# Patient Record
Sex: FEMALE | Employment: FULL TIME | URBAN - NONMETROPOLITAN AREA
[De-identification: names, ages, dates, MRNs, and addresses within clinical notes are randomized per-mention and may not be internally consistent; named-entity substitution may affect disease eponyms.]

---

## 2022-09-11 NOTE — PROGRESS NOTES
[address] [facility] [address] [address]  Dept: [phone]  Dept Fax: [phone]: [phone]      Assessment & Plan   1. Acute post-traumatic headache, not intractable  Persistent daily headaches now, months after trauma. Concussion/vestibular therapy completed w/o help for headaches. No on caffeine or other substances. - CBC with Auto Differential; Future  - Comprehensive Metabolic Panel, Fasting; Future  - Fioricet for breakthrough headaches, to be used sparingly  - obtain MRI brain due to persistent headaches and hx of recent trauma    2. Screening due  - Lipid, Fasting; Future    Patient to obtain labs at a future date. Health Maintenance  Cervical Cancer Screening - Completed 2/10/22, normal w/o cotesting. Due 2025 next. Vaccinations  - Covid w/ Moderna. Bivalent booster.   - Flu Vaccine - encouraged  - Tetanus due 2032    Return in about 1 month (around 10/12/2022). History of Present Illness   Reason for Appointment:   Chief Complaint   Patient presents with    New Patient     Pt presents as a new patient. She would like to talk about migraines she has had February. Mihaela Schulz is a 24 y.o. female who presents today for:  Migraines due to head injury a few months back - a steel gate, dazed her. Went to [facility] ED. No fam hx of aneurysms or kidney disease. Works at car part manufacturing. Doesn't sound like there are inhalant exposures that cause her headaches. Tylenol and excedrine makes her drowsy. But continues to take as it helps  Daily headaches for about 20-30 min, self limited. Has photo and phonophobia. Having it almost daily. Review of Systems   Constitutional: Negative. HENT: Negative. Eyes: Negative. Respiratory: Negative. Cardiovascular: Negative. Gastrointestinal: Negative. Neurological:  Positive for headaches.  Negative for dizziness, seizures, syncope, facial asymmetry, speech difficulty, weakness, light-headedness and numbness. Future Appointments   Date Time Provider Dede Burks   10/14/2022  8:00 AM Kirstin Siddiqui MD SRPX [facility] [place]       [facility]    Patient Active Problem List    Diagnosis Date Noted    Acute post-traumatic headache, not intractable 09/12/2022     Priority: Medium         PSH    No past surgical history on file. Meds    Prior to Admission medications    Medication Sig Start Date End Date Taking? Authorizing Provider   butalbital-acetaminophen-caffeine (FIORICET, ESGIC) -40 MG per tablet Take 1 tablet by mouth every 6 hours as needed for Headaches or Migraine Don't use for more than 3 days to avoid overuse headaches. 9/12/22  Yes Jess López, DO        Allergies    Patient has no known allergies. Social    Social History     Tobacco Use    Smoking status: Never    Smokeless tobacco: Never   Substance Use Topics    Alcohol use: Never    Drug use: Never       Objective     Vitals:    09/12/22 0815   BP: 110/82   Pulse: 86   Resp: 12   Temp: (!) 96.7 °F (35.9 °C)   SpO2: 100%       Physical Exam:  GENERAL: Alert and oriented. No distress. EYES: no papilledema. EOMI.  ENT: No thyromegaly or cervical lymphadenopathy. No JVD. HEART: Normal S1/S2. No murmur, rub or gallop. LUNGS: Clear to auscultation. ABDOMEN: Soft and non-tender.   EXTREMITIES: no edema  NEUROLOGICAL: Grossly normal.  SKIN: no rashes    Electronically signed by Kirstin Siddiqui MD on 9/12/2022 at 11:39 PM

## 2022-09-12 ENCOUNTER — OFFICE VISIT (OUTPATIENT)
Dept: FAMILY MEDICINE CLINIC | Age: 22
End: 2022-09-12
Payer: COMMERCIAL

## 2022-09-12 VITALS
SYSTOLIC BLOOD PRESSURE: 110 MMHG | DIASTOLIC BLOOD PRESSURE: 82 MMHG | TEMPERATURE: 96.7 F | HEART RATE: 86 BPM | HEIGHT: 62 IN | WEIGHT: 148.4 LBS | BODY MASS INDEX: 27.31 KG/M2 | RESPIRATION RATE: 12 BRPM | OXYGEN SATURATION: 100 %

## 2022-09-12 DIAGNOSIS — Z13.9 SCREENING DUE: ICD-10-CM

## 2022-09-12 DIAGNOSIS — G44.319 ACUTE POST-TRAUMATIC HEADACHE, NOT INTRACTABLE: Primary | ICD-10-CM

## 2022-09-12 PROCEDURE — G8427 DOCREV CUR MEDS BY ELIG CLIN: HCPCS | Performed by: STUDENT IN AN ORGANIZED HEALTH CARE EDUCATION/TRAINING PROGRAM

## 2022-09-12 PROCEDURE — G8419 CALC BMI OUT NRM PARAM NOF/U: HCPCS | Performed by: STUDENT IN AN ORGANIZED HEALTH CARE EDUCATION/TRAINING PROGRAM

## 2022-09-12 PROCEDURE — 99203 OFFICE O/P NEW LOW 30 MIN: CPT | Performed by: STUDENT IN AN ORGANIZED HEALTH CARE EDUCATION/TRAINING PROGRAM

## 2022-09-12 PROCEDURE — 1036F TOBACCO NON-USER: CPT | Performed by: STUDENT IN AN ORGANIZED HEALTH CARE EDUCATION/TRAINING PROGRAM

## 2022-09-12 RX ORDER — BUTALBITAL, ACETAMINOPHEN AND CAFFEINE 50; 325; 40 MG/1; MG/1; MG/1
1 TABLET ORAL EVERY 6 HOURS PRN
Qty: 10 TABLET | Refills: 0 | Status: SHIPPED | OUTPATIENT
Start: 2022-09-12

## 2022-09-12 SDOH — ECONOMIC STABILITY: FOOD INSECURITY: WITHIN THE PAST 12 MONTHS, THE FOOD YOU BOUGHT JUST DIDN'T LAST AND YOU DIDN'T HAVE MONEY TO GET MORE.: SOMETIMES TRUE

## 2022-09-12 SDOH — ECONOMIC STABILITY: FOOD INSECURITY: WITHIN THE PAST 12 MONTHS, YOU WORRIED THAT YOUR FOOD WOULD RUN OUT BEFORE YOU GOT MONEY TO BUY MORE.: SOMETIMES TRUE

## 2022-09-12 ASSESSMENT — PATIENT HEALTH QUESTIONNAIRE - PHQ9
1. LITTLE INTEREST OR PLEASURE IN DOING THINGS: 1
SUM OF ALL RESPONSES TO PHQ QUESTIONS 1-9: 2
2. FEELING DOWN, DEPRESSED OR HOPELESS: 1
SUM OF ALL RESPONSES TO PHQ QUESTIONS 1-9: 2
SUM OF ALL RESPONSES TO PHQ9 QUESTIONS 1 & 2: 2

## 2022-09-12 ASSESSMENT — SOCIAL DETERMINANTS OF HEALTH (SDOH): HOW HARD IS IT FOR YOU TO PAY FOR THE VERY BASICS LIKE FOOD, HOUSING, MEDICAL CARE, AND HEATING?: NOT VERY HARD

## 2022-09-12 ASSESSMENT — ENCOUNTER SYMPTOMS
GASTROINTESTINAL NEGATIVE: 1
EYES NEGATIVE: 1
RESPIRATORY NEGATIVE: 1

## 2022-09-12 NOTE — PROGRESS NOTES
S: 24 y.o. female with   Chief Complaint   Patient presents with    New Patient     Pt presents as a new patient. She would like to talk about migraines she has had February. HPI: please see resident note for HPI and ROS. New patient. Headaches - hit in the head at work a couple months ago, completed concussion therapy. She is having daily headaches now, last 20-30 minutes. Tylenol, Excedrin, Ibuprofen helpful. Photosensitivity associated. Otherwise healthy, no concerns. BP Readings from Last 3 Encounters:   09/12/22 110/82     Wt Readings from Last 3 Encounters:   09/12/22 148 lb 6.4 oz (67.3 kg)       O: VS:  height is 5' 2\" (1.575 m) and weight is 148 lb 6.4 oz (67.3 kg). Her temperature is 96.7 °F (35.9 °C) (abnormal). Her blood pressure is 110/82 and her pulse is 86. Her respiration is 12 and oxygen saturation is 100%. AAO/NAD, appropriate affect for mood  CV:  RRR, no murmur  Resp: CTAB    See Resident note for complete exam    1. Acute post-traumatic headache, not intractable  - CBC with Auto Differential; Future  - Comprehensive Metabolic Panel, Fasting; Future    2. Screening due  - Lipid, Fasting; Future      Plan:  Please refer to resident note for full plan. Will check MRI brain  Trial Fioricet   Check basic labs  Follow-up after imaging complete       Return in about 1 month (around 10/12/2022). Health Maintenance Due   Topic Date Due    COVID-19 Vaccine (1) Never done    Varicella vaccine (2 of 2 - 2-dose childhood series) 08/10/2006    HPV vaccine (1 - 2-dose series) Never done    Depression Screen  Never done    HIV screen  Never done    Hepatitis C screen  Never done    Flu vaccine (1) Never done     I have discussed the case, including pertinent history and exam findings with the resident and attending physician. I agree with the documented assessment and plan as documented by the resident.       Ruddy March MD 9/12/2022 9:06 AM

## 2022-09-12 NOTE — PATIENT INSTRUCTIONS
MRI of Brain  Fioricet prescribed to [facility]  CBC, CMP, and Fasting Lipids ordered  Bivalent Booster Appt at [place] [address] when able. Where to get your labs:  3 locations: If you have imaging studies to do, go to the third option. [facility] stand-alone building with parking out front  [address] [facility] ([employer], just across the street from [address])  [place], [address]  P: [phone]    Hours:  Milon Mater 6:00AM-6:00PM  Saturday 6:30AM-12:00PM      [address] [facility] [address] Thomas on the second floor of same building where you had your family medicine appointment  [address] [facility] [address] [facility], [address]  P: [phone]    Hours: (Note restricted hours)  Mon - Thu 8:00AM-3:00PM  Closed for lunch - 12:00pm - 1:00PM  Fri - Closed    [facility] - can also get x-ray, CT scans, other imaging here  [address] [place], [address]  Tel: [phone]    Hours:  Annette Zacariasg - Fri 6am to 5pm  Saturday - 6:00 am - 12:00 pm *xray,ct, other imaging services not available at this location on weekends.   Sunday - Closed

## 2022-09-12 NOTE — PROGRESS NOTES
S: 24 y.o. female with   Chief Complaint   Patient presents with    New Patient     Pt presents as a new patient. She would like to talk about migraines she has had February. HPI: please see resident note for HPI and ROS. BP Readings from Last 3 Encounters:   09/12/22 110/82     Wt Readings from Last 3 Encounters:   09/12/22 148 lb 6.4 oz (67.3 kg)       O: VS:  height is 5' 2\" (1.575 m) and weight is 148 lb 6.4 oz (67.3 kg). Her temperature is 96.7 °F (35.9 °C) (abnormal). Her blood pressure is 110/82 and her pulse is 86. Her respiration is 12 and oxygen saturation is 100%. AAO/NAD, appropriate affect for mood  CV:  RRR, no murmur  Resp: CTAB       Diagnosis Orders   1. Acute post-traumatic headache, not intractable  CBC with Auto Differential    Comprehensive Metabolic Panel, Fasting    butalbital-acetaminophen-caffeine (FIORICET, ESGIC) -40 MG per tablet      2. Screening due  Lipid, Fasting          Plan:  Please refer to resident note for full plan. 68-year-old female presents the office to establish as a new patient and discuss concerns of headache. Overall patient doing really well with no concerns. Headaches: Patient has had posttraumatic headaches after concussion a few months ago. Continues to experience phonophobia and photophobia on a daily basis with headaches lasting 20 to 30 minutes. Is able to be resolved with medication such as Tylenol/Excedrin/ibuprofen. Headaches have not changed since this time. Has already completed concussion therapy/vestibular therapy has not resolved her symptoms. Patient is interested in discussing possible treatment options. We will plan to obtain MRI brain secondary to new onset headaches in an adult that are worsening/not improving, that are posttraumatic without history of imaging in the past, and neurologic symptoms of photophobia/phonophobia. Will use Fioricet as needed for breakthrough headaches.   Plan to follow-up in 1 month for headache reevaluation and review imaging. Will also plan to obtain fasting blood work for review. Health Maintenance Due   Topic Date Due    COVID-19 Vaccine (1) Never done    Varicella vaccine (2 of 2 - 2-dose childhood series) 08/10/2006    HPV vaccine (1 - 2-dose series) Never done    Depression Screen  Never done    HIV screen  Never done    Hepatitis C screen  Never done    Flu vaccine (1) Never done       Attending Physician Statement  I have discussed the case, including pertinent history and exam findings with the resident. I also have seen the patient and performed key portions of the examination. I agree with the documented assessment and plan as documented by the resident.   BEA Nina DO 9/12/2022 9:10 AM

## 2022-09-23 ENCOUNTER — HOSPITAL ENCOUNTER (OUTPATIENT)
Dept: MRI IMAGING | Age: 22
Discharge: HOME | End: 2022-09-23
Payer: COMMERCIAL

## 2022-09-23 DIAGNOSIS — G44.319 ACUTE POST-TRAUMATIC HEADACHE, NOT INTRACTABLE: ICD-10-CM

## 2022-09-23 PROCEDURE — 70551 MRI BRAIN STEM W/O DYE: CPT

## 2022-09-26 ENCOUNTER — TELEPHONE (OUTPATIENT)
Dept: FAMILY MEDICINE CLINIC | Age: 22
End: 2022-09-26

## 2022-09-26 NOTE — TELEPHONE ENCOUNTER
----- Message from Rufian Mendez MD sent at 9/24/2022 11:14 PM EDT -----  Let patient know her MRI results showed no abnormalities. Remind her to keep a headache diary and I'll see her at the next appointment. Feel free to reach out if you have questions or concerns. Thanks!

## 2022-11-07 ENCOUNTER — OFFICE VISIT (OUTPATIENT)
Dept: FAMILY MEDICINE CLINIC | Age: 22
End: 2022-11-07
Payer: COMMERCIAL

## 2022-11-07 VITALS
SYSTOLIC BLOOD PRESSURE: 112 MMHG | TEMPERATURE: 97.4 F | BODY MASS INDEX: 27.82 KG/M2 | OXYGEN SATURATION: 98 % | DIASTOLIC BLOOD PRESSURE: 64 MMHG | HEIGHT: 62 IN | HEART RATE: 58 BPM | WEIGHT: 151.2 LBS

## 2022-11-07 DIAGNOSIS — Z13.9 SCREENING DUE: ICD-10-CM

## 2022-11-07 DIAGNOSIS — R51.9 NONINTRACTABLE HEADACHE, UNSPECIFIED CHRONICITY PATTERN, UNSPECIFIED HEADACHE TYPE: Primary | ICD-10-CM

## 2022-11-07 PROCEDURE — 99213 OFFICE O/P EST LOW 20 MIN: CPT | Performed by: STUDENT IN AN ORGANIZED HEALTH CARE EDUCATION/TRAINING PROGRAM

## 2022-11-07 PROCEDURE — G8484 FLU IMMUNIZE NO ADMIN: HCPCS | Performed by: STUDENT IN AN ORGANIZED HEALTH CARE EDUCATION/TRAINING PROGRAM

## 2022-11-07 PROCEDURE — G8427 DOCREV CUR MEDS BY ELIG CLIN: HCPCS | Performed by: STUDENT IN AN ORGANIZED HEALTH CARE EDUCATION/TRAINING PROGRAM

## 2022-11-07 PROCEDURE — 1036F TOBACCO NON-USER: CPT | Performed by: STUDENT IN AN ORGANIZED HEALTH CARE EDUCATION/TRAINING PROGRAM

## 2022-11-07 PROCEDURE — G8419 CALC BMI OUT NRM PARAM NOF/U: HCPCS | Performed by: STUDENT IN AN ORGANIZED HEALTH CARE EDUCATION/TRAINING PROGRAM

## 2022-11-07 RX ORDER — BUTALBITAL, ACETAMINOPHEN AND CAFFEINE 50; 325; 40 MG/1; MG/1; MG/1
1 TABLET ORAL EVERY 6 HOURS PRN
Qty: 10 TABLET | Refills: 2 | Status: SHIPPED | OUTPATIENT
Start: 2022-11-07

## 2022-11-07 ASSESSMENT — ENCOUNTER SYMPTOMS
EYES NEGATIVE: 1
GASTROINTESTINAL NEGATIVE: 1
RESPIRATORY NEGATIVE: 1

## 2022-11-07 NOTE — PROGRESS NOTES
Health Maintenance Due   Topic Date Due    Varicella vaccine (2 of 2 - 2-dose childhood series) 08/10/2006    HPV vaccine (1 - 2-dose series) Never done    HIV screen  Never done    Hepatitis C screen  Never done    COVID-19 Vaccine (3 - Booster for Moderna series) 07/31/2021    Flu vaccine (1) Never done

## 2022-11-07 NOTE — PROGRESS NOTES
[address] [facility] [address] E[address] [facility] [address]  Dept: [phone]  Loc: [phone]      Shen Jo (:  2000) is a 24 y.o. female,Established patient, here for evaluation of the following chief complaint(s):  1 Month Follow-Up and Epistaxis (Frequent nose bleeds during the day.)      ASSESSMENT/PLAN:  1. Acute post-traumatic headache, not intractable  The following orders have not been finalized:  -     butalbital-acetaminophen-caffeine (FIORICET, ESGIC) -40 MG per tablet  2. Screening due    Headache diary  Continue Fioricet sparingly as needed. Please see resident note for full Assessment/Plan, HPI, ROS and PE    Return if symptoms worsen or fail to improve, for depending on headache diary. .    SUBJECTIVE/OBJECTIVE:  HPI  Photophobia  Accidental trauma to head with headaches, negative MRI. Sparing use of Fioricet resolves headache symptoms. Thought to be stress related as she still has headaches. Vitals:    22 1006   BP: 112/64   Site: Left Upper Arm   Position: Sitting   Cuff Size: Medium Adult   Pulse: 58   Temp: 97.4 °F (36.3 °C)   TempSrc: Temporal   SpO2: 98%   Weight: 151 lb 3.2 oz (68.6 kg)   Height: 5' 2\" (1.575 m)         An electronic signature was used to authenticate this note.     --Michelle Peraza MD

## 2022-11-07 NOTE — PATIENT INSTRUCTIONS
Zyrtec  Oxymetazoline    Where to get your labs:  3 locations: If you have imaging studies to do, go to the third option. [facility] stand-alone building with parking out front  [address] [facility] ([employer], just across the street from [address])  [address] [facility], [address]  P: [phone]    Hours:  Adelfo Wolf 6:00AM-6:00PM  Saturday 6:30AM-12:00PM      Chase Thomas on the second floor of same building where you had your family medicine appointment  [address] [facility] [address] [facility], [address]  P: [phone]    Hours: (Note restricted hours)  Mon - Thu 8:00AM-3:00PM  Closed for lunch - 12:00pm - 1:00PM  Fri - Closed    [facility] - can also get x-ray, CT scans, other imaging here  [address]   [address] [facility], [address]  Tel: [phone]    Hours:  Dory Solo - Fri 6am to 5pm  Saturday - 6:00 am - 12:00 pm *xray,ct, other imaging services not available at this location on weekends.   Sunday - Closed

## 2022-11-07 NOTE — Clinical Note
[address] [place] [address],[address] [facility] [address]  Phone: [phone]  Fax: [phone]    Neto Olson MD        November 7, 2022     Patient: Jack Beatty   YOB: 2000   Date of Visit: 11/7/2022       To Whom It May Concern: It is my medical opinion that Jack Beatty {Work release (duty restriction):40095}. If you have any questions or concerns, please don't hesitate to call.     Sincerely,        Neto Olson MD

## 2022-11-07 NOTE — PROGRESS NOTES
[address] [facility] [address] [facility] [address]  Dept: [phone]  Dept Fax: [phone]: [phone]      Assessment & Plan   1. Nonintractable headache, unspecified chronicity pattern, unspecified headache type  Continues to have mild headaches. Asked to keep headache diary and for patient to reach out in a few weeks with the results before deciding on further follow up. Will refill fioricet to be used sparingly. MRI brain wnl. Has recovered for previous head injury. - butalbital-acetaminophen-caffeine (FIORICET, ESGIC) -40 MG per tablet; Take 1 tablet by mouth every 6 hours as needed for Headaches or Migraine Don't use for more than 3 days to avoid overuse headaches. Dispense: 10 tablet; Refill: 2    2. Screening due  - Hepatitis C Antibody; Future  - HIV Screen; Future     Health Maintenance    Vaccinations  - Covid w/ Moderna - need bivalent booster.   - Flu Vaccine - encouraged  - HPV?  - HIV/Hep C    Return if symptoms worsen or fail to improve, for depending on headache diary. .     History of Present Illness   Reason for Appointment:   Chief Complaint   Patient presents with    1 Month Follow-Up    Epistaxis     Frequent nose bleeds during the day. Elena Lui is a 24 y.o. female who presents today for:follow up of headaches    MRI brain wnl. Need to obtain routine labs still. Asked to keep headache diary. Headaches. Nose bleeds about 3-4 times last month. Painful callus on pinky toe - foot doctor     Prev Hx:  Migraines due to head injury a few months back - a steel gate, dazed her. Went to [facility] ED. No fam hx of aneurysms or kidney disease. Works at car part manufacturing. Tylenol and excedrine makes her drowsy. But continues to take as it helps  Daily headaches for about 20-30 min, self limited. Has photo and phonophobia. Having it almost daily.       Review of Systems Constitutional: Negative. HENT: Negative. Eyes: Negative. Respiratory: Negative. Cardiovascular: Negative. Gastrointestinal: Negative. Neurological:  Positive for headaches. Negative for dizziness, seizures, syncope, facial asymmetry, speech difficulty, weakness, light-headedness and numbness. No future appointments. PMH    Patient Active Problem List    Diagnosis Date Noted    Acute post-traumatic headache, not intractable 09/12/2022     Priority: Medium         PSH    No past surgical history on file. Meds    Prior to Admission medications    Medication Sig Start Date End Date Taking? Authorizing Provider   butalbital-acetaminophen-caffeine (FIORICET, ESGIC) -40 MG per tablet Take 1 tablet by mouth every 6 hours as needed for Headaches or Migraine Don't use for more than 3 days to avoid overuse headaches. 11/7/22  Yes Carlos Marino MD        Allergies    Patient has no known allergies. Social    Social History     Tobacco Use    Smoking status: Never    Smokeless tobacco: Never   Substance Use Topics    Alcohol use: Never    Drug use: Never       Objective     Vitals:    11/07/22 1006   BP: 112/64   Pulse: 58   Temp: 97.4 °F (36.3 °C)   SpO2: 98%       Physical Exam:  GENERAL: Alert and oriented. No distress. EYES: no papilledema. EOMI.  ENT: No thyromegaly or cervical lymphadenopathy. No JVD. HEART: Normal S1/S2. No murmur, rub or gallop. LUNGS: Clear to auscultation. ABDOMEN: Soft and non-tender.   EXTREMITIES: no edema  NEUROLOGICAL: Grossly normal.  SKIN: no rashes    Electronically signed by Carlos Marino MD on 11/7/2022 at 6:42 PM

## 2022-11-07 NOTE — PROGRESS NOTES
S: 24 y.o. female with   Chief Complaint   Patient presents with    1 Month Follow-Up    Epistaxis     Frequent nose bleeds during the day. HPI: please see resident note for HPI and ROS. BP Readings from Last 3 Encounters:   11/07/22 112/64   09/12/22 110/82     Wt Readings from Last 3 Encounters:   11/07/22 151 lb 3.2 oz (68.6 kg)   09/12/22 148 lb 6.4 oz (67.3 kg)       O: VS:  height is 5' 2\" (1.575 m) and weight is 151 lb 3.2 oz (68.6 kg). Her temporal temperature is 97.4 °F (36.3 °C). Her blood pressure is 112/64 and her pulse is 58. Her oxygen saturation is 98%. Physical exam performed by resident physician. Diagnosis Orders   1. Nonintractable headache, unspecified chronicity pattern, unspecified headache type  butalbital-acetaminophen-caffeine (FIORICET, ESGIC) -40 MG per tablet      2. Screening due  Hepatitis C Antibody    HIV Screen          Plan:  Please refer to resident note for full plan. 49-year-old female presents the office to follow-up on headaches. Patient previously had an accidental trauma to the head resulting in headaches. Obtain MRI brain which was normal.  Was believed this was due to stress from life/roommates. Currently uses Fioricet sparingly on very severe headaches which does abort them. We will continue to use Fioricet only as needed for severe headaches. We will start headache diary and follow-up to consider preventative therapy. Health Maintenance Due   Topic Date Due    Varicella vaccine (2 of 2 - 2-dose childhood series) 08/10/2006    HPV vaccine (1 - 2-dose series) Never done    HIV screen  Never done    Hepatitis C screen  Never done    COVID-19 Vaccine (3 - Booster for Moderna series) 07/31/2021    Flu vaccine (1) Never done       Attending Physician Statement  I have discussed the case, including pertinent history and exam findings with the resident. I agree with the documented assessment and plan as documented by the resident. [address] [facility],  11/8/2022 7:07 AM

## 2022-11-07 NOTE — LETTER
[address] [place] [address],[address] [place] [address]  Phone: [phone]  Fax: [phone]    Michael Coronado MD        November 7, 2022     Patient: Maria De Jesus Santos   YOB: 2000   Date of Visit: 11/7/2022       To Whom it May Concern:    Maria De Jesus Santos was seen in my clinic on 11/7/2022. Please excuse her from work for today. Thank you. If you have any questions or concerns, please don't hesitate to call.     Sincerely,         Michael Coronado MD

## 2023-05-12 ENCOUNTER — NURSE ONLY (OUTPATIENT)
Dept: LAB | Age: 23
End: 2023-05-12

## 2023-05-12 ENCOUNTER — OFFICE VISIT (OUTPATIENT)
Dept: FAMILY MEDICINE CLINIC | Age: 23
End: 2023-05-12

## 2023-05-12 VITALS
RESPIRATION RATE: 14 BRPM | DIASTOLIC BLOOD PRESSURE: 62 MMHG | WEIGHT: 155.4 LBS | BODY MASS INDEX: 28.6 KG/M2 | OXYGEN SATURATION: 99 % | TEMPERATURE: 99 F | HEART RATE: 58 BPM | SYSTOLIC BLOOD PRESSURE: 110 MMHG | HEIGHT: 62 IN

## 2023-05-12 DIAGNOSIS — G44.319 ACUTE POST-TRAUMATIC HEADACHE, NOT INTRACTABLE: ICD-10-CM

## 2023-05-12 DIAGNOSIS — F41.9 ANXIETY: ICD-10-CM

## 2023-05-12 DIAGNOSIS — Z13.9 SCREENING DUE: ICD-10-CM

## 2023-05-12 DIAGNOSIS — F32.A DEPRESSION, UNSPECIFIED DEPRESSION TYPE: Primary | ICD-10-CM

## 2023-05-12 DIAGNOSIS — Z13.31 DEPRESSION SCREEN: ICD-10-CM

## 2023-05-12 LAB
ALBUMIN SERPL BCG-MCNC: 4.8 G/DL (ref 3.5–5.1)
ALP SERPL-CCNC: 64 U/L (ref 38–126)
ALT SERPL W/O P-5'-P-CCNC: 20 U/L (ref 11–66)
ANION GAP SERPL CALC-SCNC: 9 MEQ/L (ref 8–16)
AST SERPL-CCNC: 21 U/L (ref 5–40)
BASOPHILS ABSOLUTE: 0 THOU/MM3 (ref 0–0.1)
BASOPHILS NFR BLD AUTO: 0.6 %
BILIRUB SERPL-MCNC: 0.4 MG/DL (ref 0.3–1.2)
BUN SERPL-MCNC: 9 MG/DL (ref 7–22)
CALCIUM SERPL-MCNC: 9.7 MG/DL (ref 8.5–10.5)
CHLORIDE SERPL-SCNC: 103 MEQ/L (ref 98–111)
CHOLESTEROL, FASTING: 199 MG/DL (ref 100–199)
CO2 SERPL-SCNC: 26 MEQ/L (ref 23–33)
CREAT SERPL-MCNC: 0.7 MG/DL (ref 0.4–1.2)
DEPRECATED RDW RBC AUTO: 44.5 FL (ref 35–45)
EOSINOPHIL NFR BLD AUTO: 2.4 %
EOSINOPHILS ABSOLUTE: 0.1 THOU/MM3 (ref 0–0.4)
ERYTHROCYTE [DISTWIDTH] IN BLOOD BY AUTOMATED COUNT: 12.8 % (ref 11.5–14.5)
GFR SERPL CREATININE-BSD FRML MDRD: > 60 ML/MIN/1.73M2
GLUCOSE FASTING: 88 MG/DL (ref 70–108)
HCT VFR BLD AUTO: 42.6 % (ref 37–47)
HCV IGG SERPL QL IA: NEGATIVE
HDLC SERPL-MCNC: 73 MG/DL
HGB BLD-MCNC: 13.8 GM/DL (ref 12–16)
IMM GRANULOCYTES # BLD AUTO: 0.01 THOU/MM3 (ref 0–0.07)
IMM GRANULOCYTES NFR BLD AUTO: 0.2 %
LDLC SERPL CALC-MCNC: 114 MG/DL
LYMPHOCYTES ABSOLUTE: 2 THOU/MM3 (ref 1–4.8)
LYMPHOCYTES NFR BLD AUTO: 40.8 %
MCH RBC QN AUTO: 30.6 PG (ref 26–33)
MCHC RBC AUTO-ENTMCNC: 32.4 GM/DL (ref 32.2–35.5)
MCV RBC AUTO: 94.5 FL (ref 81–99)
MONOCYTES ABSOLUTE: 0.4 THOU/MM3 (ref 0.4–1.3)
MONOCYTES NFR BLD AUTO: 8 %
NEUTROPHILS NFR BLD AUTO: 48 %
NRBC BLD AUTO-RTO: 0 /100 WBC
PLATELET # BLD AUTO: 307 THOU/MM3 (ref 130–400)
PMV BLD AUTO: 10.4 FL (ref 9.4–12.4)
POTASSIUM SERPL-SCNC: 4.5 MEQ/L (ref 3.5–5.2)
PROT SERPL-MCNC: 7.6 G/DL (ref 6.1–8)
RBC # BLD AUTO: 4.51 MILL/MM3 (ref 4.2–5.4)
SEGMENTED NEUTROPHILS ABSOLUTE COUNT: 2.4 THOU/MM3 (ref 1.8–7.7)
SODIUM SERPL-SCNC: 138 MEQ/L (ref 135–145)
TRIGLYCERIDE, FASTING: 59 MG/DL (ref 0–199)
WBC # BLD AUTO: 5 THOU/MM3 (ref 4.8–10.8)

## 2023-05-12 SDOH — ECONOMIC STABILITY: FOOD INSECURITY: WITHIN THE PAST 12 MONTHS, THE FOOD YOU BOUGHT JUST DIDN'T LAST AND YOU DIDN'T HAVE MONEY TO GET MORE.: PATIENT DECLINED

## 2023-05-12 SDOH — ECONOMIC STABILITY: HOUSING INSECURITY
IN THE LAST 12 MONTHS, WAS THERE A TIME WHEN YOU DID NOT HAVE A STEADY PLACE TO SLEEP OR SLEPT IN A SHELTER (INCLUDING NOW)?: PATIENT REFUSED

## 2023-05-12 SDOH — ECONOMIC STABILITY: INCOME INSECURITY: HOW HARD IS IT FOR YOU TO PAY FOR THE VERY BASICS LIKE FOOD, HOUSING, MEDICAL CARE, AND HEATING?: PATIENT DECLINED

## 2023-05-12 SDOH — ECONOMIC STABILITY: TRANSPORTATION INSECURITY
IN THE PAST 12 MONTHS, HAS LACK OF TRANSPORTATION KEPT YOU FROM MEETINGS, WORK, OR FROM GETTING THINGS NEEDED FOR DAILY LIVING?: PATIENT DECLINED

## 2023-05-12 SDOH — ECONOMIC STABILITY: FOOD INSECURITY: WITHIN THE PAST 12 MONTHS, YOU WORRIED THAT YOUR FOOD WOULD RUN OUT BEFORE YOU GOT MONEY TO BUY MORE.: PATIENT DECLINED

## 2023-05-12 ASSESSMENT — PATIENT HEALTH QUESTIONNAIRE - PHQ9
10. IF YOU CHECKED OFF ANY PROBLEMS, HOW DIFFICULT HAVE THESE PROBLEMS MADE IT FOR YOU TO DO YOUR WORK, TAKE CARE OF THINGS AT HOME, OR GET ALONG WITH OTHER PEOPLE: 2
SUM OF ALL RESPONSES TO PHQ QUESTIONS 1-9: 11
1. LITTLE INTEREST OR PLEASURE IN DOING THINGS: 2
6. FEELING BAD ABOUT YOURSELF - OR THAT YOU ARE A FAILURE OR HAVE LET YOURSELF OR YOUR FAMILY DOWN: 1
SUM OF ALL RESPONSES TO PHQ9 QUESTIONS 1 & 2: 4
SUM OF ALL RESPONSES TO PHQ QUESTIONS 1-9: 11
7. TROUBLE CONCENTRATING ON THINGS, SUCH AS READING THE NEWSPAPER OR WATCHING TELEVISION: 2
2. FEELING DOWN, DEPRESSED OR HOPELESS: 2
SUM OF ALL RESPONSES TO PHQ QUESTIONS 1-9: 11
9. THOUGHTS THAT YOU WOULD BE BETTER OFF DEAD, OR OF HURTING YOURSELF: 0
8. MOVING OR SPEAKING SO SLOWLY THAT OTHER PEOPLE COULD HAVE NOTICED. OR THE OPPOSITE, BEING SO FIGETY OR RESTLESS THAT YOU HAVE BEEN MOVING AROUND A LOT MORE THAN USUAL: 1
4. FEELING TIRED OR HAVING LITTLE ENERGY: 2
5. POOR APPETITE OR OVEREATING: 1
SUM OF ALL RESPONSES TO PHQ QUESTIONS 1-9: 11
3. TROUBLE FALLING OR STAYING ASLEEP: 0

## 2023-05-12 NOTE — PROGRESS NOTES
[address] [facility] [address] [address]  Dept: [phone]  Loc: [phone]      Please see Resident note for complete HPI. ROS per Resident    No results found for: WBC, HGB, HCT, MCV, PLT  No results found for: NA, K, CL, CO2, BUN, CREATININE, GLUCOSE, CALCIUM, PROT, LABALBU, BILITOT, ALKPHOS, AST, ALT, LABGLOM, GFRAA, AGRATIO, GLOB  No results found for: LABA1C  No results found for: EAG  No results found for: Lenn Heater  No results found for: TSH, C0UKBED, G0EAPZQ, THYROIDAB, FT3, T4FREE  No results found for: CHOL  No results found for: TRIG  No results found for: HDL  No results found for: LDLCHOLESTEROL, LDLCALC  No results found for: LABVLDL, VLDL  No results found for: CHOLHDLRATIO  No results found for: PSA, PSADIA    Health Maintenance Due   Topic Date Due    Varicella vaccine (2 of 2 - 2-dose childhood series) 08/10/2006    HPV vaccine (1 - 2-dose series) Never done    HIV screen  Never done    Hepatitis C screen  Never done    COVID-19 Vaccine (3 - Booster for Moderna series) 07/31/2021    [address] screen  02/10/2023         Physical Exam per Resident       ICD-10-CM    1. Depression, unspecified depression type  F32. A               Plan  I participated in the discussion and care of this patient   Full plan per primary resident.

## 2023-05-12 NOTE — PROGRESS NOTES
[address] [address] [facility] [address]  Dept: [phone]  Dept Fax: [phone]: [phone]      Assessment & Plan     1. Depression, unspecified depression type    2. Anxiety       Orders Placed This Encounter    sertraline (ZOLOFT) 50 MG tablet     Sig: Take 1 tablet by mouth daily     Dispense:  60 tablet     Refill:  0     Will start zoloft 50 mg daily for anxiety/depression  May self titrate up to 100 mg if no side effects in 2-3 weeks. Patient to reach out after going to 100 mg to report on any sides. Obtain previously ordered labs. If concentration issues do not resolve with control of depression/anxiety, may consider referral for ADHD evaluation    Patient Instructions   1. Get labs  2. Start zoloft 50 mg. Can go up to 100 mg in 2-3 weeks if you don't feel any side effects  3. Reach out to me via Animated Speech message to let me know how you feel and any side effects after going to 100 mg zoloft at any time, as often as you need. Where to get your labs: 4 options  There are several locations near the family medicine clinic. Imaging services are at the main hospital (see 3rd option below)    1. [facility] stand-alone building with parking out front  [address] [place] [address] ([facility] and [employer], just across the street from the Emergency Dept / [address])  [address] [facility], [address]  P: [phone]    Hours:  Tiffany Rene 6:00AM - 6:00PM  Fri 6 AM to 4 PM  Saturday 6:30AM - Noon      2. [facility] on the second floor of same building where you had your family medicine appointment  [address] [facility]. [address] [place] [address], [address]  P: [phone]    Hours:  Mon - Thu 8:30AM-3:00PM  Closed for lunch - 12:00pm - 1:00PM  Fri - Closed    3.  [facility]  Can also get x-ray, CT scans, other imaging here  [address], [address]  Tel:

## 2023-05-12 NOTE — PATIENT INSTRUCTIONS
1. Get labs  2. Start zoloft 50 mg. Can go up to 100 mg in 2-3 weeks if you don't feel any side effects  3. Reach out to me via [employer] message to let me know how you feel and any side effects after going to 100 mg zoloft at any time, as often as you need. Where to get your labs: 4 options  There are several locations near the family medicine clinic. Imaging services are at the main hospital (see 3rd option below)    1. [facility] stand-alone building with parking out front  [address] [place] [address] ([employer], just across the street from the Emergency Dept / [address])  [facility], [address]  P: [phone]    Hours:  Dominique Jj 6:00AM - 6:00PM  Fri 6 AM to 4 PM  Saturday 6:30AM - Noon      2. [facility] on the second floor of same building where you had your family medicine appointment  [address] [facility], [address]  P: [phone]    Hours:  Mon - Thu 8:30AM-3:00PM  Closed for lunch - 12:00pm - 1:00PM  Fri - Closed    3. [facility]  Can also get x-ray, CT scans, other imaging here  [facility], [address]  Tel: [phone]    Hours:  Corey Martinez - Fri 6am to 5pm  Saturday - 6:00 am - 12:00 pm *xray,ct, other imaging services not available at this location on weekends. Sunday - Closed    4. [facility] lab and imaging centers:  [employer]/locations/specialty-locations/lab-and-imaging-centers  *Please call before you go to double check times and whether they can definitely obtain labs for you.

## 2023-05-13 LAB — HIV 1+2 AB+HIV1 P24 AG SERPL QL IA: NONREACTIVE

## 2023-05-14 PROBLEM — F41.9 ANXIETY: Status: ACTIVE | Noted: 2023-05-14

## 2023-05-14 ASSESSMENT — ENCOUNTER SYMPTOMS
GASTROINTESTINAL NEGATIVE: 1
EYES NEGATIVE: 1
RESPIRATORY NEGATIVE: 1

## 2023-06-16 PROBLEM — G44.229 CHRONIC TENSION HEADACHE: Status: ACTIVE | Noted: 2022-09-12

## 2023-06-16 PROBLEM — F41.9 ANXIETY: Status: RESOLVED | Noted: 2023-05-14 | Resolved: 2023-06-16

## 2023-07-18 ENCOUNTER — OFFICE VISIT (OUTPATIENT)
Dept: FAMILY MEDICINE CLINIC | Age: 23
End: 2023-07-18
Payer: COMMERCIAL

## 2023-07-18 ENCOUNTER — NURSE ONLY (OUTPATIENT)
Dept: LAB | Age: 23
End: 2023-07-18

## 2023-07-18 VITALS
HEART RATE: 61 BPM | BODY MASS INDEX: 28.41 KG/M2 | RESPIRATION RATE: 18 BRPM | TEMPERATURE: 98 F | OXYGEN SATURATION: 99 % | HEIGHT: 62 IN | WEIGHT: 154.4 LBS | DIASTOLIC BLOOD PRESSURE: 74 MMHG | SYSTOLIC BLOOD PRESSURE: 112 MMHG

## 2023-07-18 DIAGNOSIS — R51.9 NONINTRACTABLE HEADACHE, UNSPECIFIED CHRONICITY PATTERN, UNSPECIFIED HEADACHE TYPE: ICD-10-CM

## 2023-07-18 DIAGNOSIS — F32.A DEPRESSION, UNSPECIFIED DEPRESSION TYPE: ICD-10-CM

## 2023-07-18 DIAGNOSIS — F32.A DEPRESSION, UNSPECIFIED DEPRESSION TYPE: Primary | ICD-10-CM

## 2023-07-18 DIAGNOSIS — N94.6 DYSMENORRHEA: ICD-10-CM

## 2023-07-18 LAB
CONTROL: NORMAL
PREGNANCY TEST URINE, POC: NEGATIVE
TSH SERPL DL<=0.005 MIU/L-ACNC: 1.2 UIU/ML (ref 0.4–4.2)

## 2023-07-18 PROCEDURE — G8419 CALC BMI OUT NRM PARAM NOF/U: HCPCS | Performed by: STUDENT IN AN ORGANIZED HEALTH CARE EDUCATION/TRAINING PROGRAM

## 2023-07-18 PROCEDURE — 1036F TOBACCO NON-USER: CPT | Performed by: STUDENT IN AN ORGANIZED HEALTH CARE EDUCATION/TRAINING PROGRAM

## 2023-07-18 PROCEDURE — 99214 OFFICE O/P EST MOD 30 MIN: CPT | Performed by: STUDENT IN AN ORGANIZED HEALTH CARE EDUCATION/TRAINING PROGRAM

## 2023-07-18 PROCEDURE — 81025 URINE PREGNANCY TEST: CPT | Performed by: STUDENT IN AN ORGANIZED HEALTH CARE EDUCATION/TRAINING PROGRAM

## 2023-07-18 PROCEDURE — G8427 DOCREV CUR MEDS BY ELIG CLIN: HCPCS | Performed by: STUDENT IN AN ORGANIZED HEALTH CARE EDUCATION/TRAINING PROGRAM

## 2023-07-18 RX ORDER — BUTALBITAL, ACETAMINOPHEN AND CAFFEINE 50; 325; 40 MG/1; MG/1; MG/1
1 TABLET ORAL EVERY 6 HOURS PRN
Qty: 20 TABLET | Refills: 2 | Status: SHIPPED | OUTPATIENT
Start: 2023-07-18

## 2023-07-18 ASSESSMENT — ENCOUNTER SYMPTOMS
ABDOMINAL PAIN: 0
SHORTNESS OF BREATH: 0
COUGH: 0

## 2023-10-23 ENCOUNTER — OFFICE VISIT (OUTPATIENT)
Dept: FAMILY MEDICINE CLINIC | Age: 23
End: 2023-10-23

## 2023-10-23 VITALS
HEIGHT: 62 IN | OXYGEN SATURATION: 99 % | WEIGHT: 159.6 LBS | SYSTOLIC BLOOD PRESSURE: 120 MMHG | BODY MASS INDEX: 29.37 KG/M2 | TEMPERATURE: 98.6 F | RESPIRATION RATE: 16 BRPM | DIASTOLIC BLOOD PRESSURE: 60 MMHG | HEART RATE: 75 BPM

## 2023-10-23 DIAGNOSIS — Z12.4 CERVICAL CANCER SCREENING: ICD-10-CM

## 2023-10-23 DIAGNOSIS — R41.840 IMPAIRED CONCENTRATION: Primary | ICD-10-CM

## 2023-10-23 DIAGNOSIS — G44.229 CHRONIC TENSION-TYPE HEADACHE, NOT INTRACTABLE: ICD-10-CM

## 2023-10-23 DIAGNOSIS — F32.A DEPRESSION, UNSPECIFIED DEPRESSION TYPE: ICD-10-CM

## 2023-10-23 RX ORDER — BUPROPION HYDROCHLORIDE 150 MG/1
150 TABLET ORAL EVERY MORNING
Qty: 60 TABLET | Refills: 3 | Status: SHIPPED | OUTPATIENT
Start: 2023-10-23

## 2023-11-08 ENCOUNTER — NURSE ONLY (OUTPATIENT)
Dept: LAB | Age: 23
End: 2023-11-08

## 2023-11-12 LAB
C TRACH RRNA SPEC QL NAA+PROBE: NEGATIVE
N GONORRHOEA RRNA SPEC QL NAA+PROBE: NEGATIVE
SPEC CONTAINER SPEC: NORMAL
SPECIMEN SOURCE: NORMAL
SPECIMEN SOURCE: NORMAL
T VAGINALIS RRNA SPEC QL NAA+PROBE: NEGATIVE

## 2024-05-23 ENCOUNTER — HOSPITAL ENCOUNTER (EMERGENCY)
Age: 24
Discharge: HOME | End: 2024-05-23
Payer: MEDICAID

## 2024-05-23 VITALS
DIASTOLIC BLOOD PRESSURE: 67 MMHG | TEMPERATURE: 97.7 F | SYSTOLIC BLOOD PRESSURE: 125 MMHG | BODY MASS INDEX: 31.83 KG/M2 | WEIGHT: 173 LBS | HEIGHT: 62 IN | HEART RATE: 60 BPM | OXYGEN SATURATION: 98 % | RESPIRATION RATE: 18 BRPM

## 2024-05-23 DIAGNOSIS — R09.81 NASAL CONGESTION: ICD-10-CM

## 2024-05-23 DIAGNOSIS — J00 NASOPHARYNGITIS: Primary | ICD-10-CM

## 2024-05-23 PROCEDURE — 99213 OFFICE O/P EST LOW 20 MIN: CPT

## 2024-05-23 RX ORDER — FLUTICASONE PROPIONATE 50 MCG
2 SPRAY, SUSPENSION (ML) NASAL DAILY
Qty: 16 G | Refills: 0 | Status: SHIPPED | OUTPATIENT
Start: 2024-05-23

## 2024-05-23 RX ORDER — PREDNISONE 20 MG/1
20 TABLET ORAL DAILY
Qty: 3 TABLET | Refills: 0 | Status: SHIPPED | OUTPATIENT
Start: 2024-05-23 | End: 2024-05-26

## 2024-05-23 RX ORDER — CETIRIZINE HYDROCHLORIDE 10 MG/1
10 TABLET ORAL DAILY
Qty: 30 TABLET | Refills: 0 | Status: SHIPPED | OUTPATIENT
Start: 2024-05-23

## 2024-05-23 ASSESSMENT — ENCOUNTER SYMPTOMS
VOMITING: 0
SORE THROAT: 1
TROUBLE SWALLOWING: 0
RHINORRHEA: 1
NAUSEA: 0
SINUS PRESSURE: 1
COUGH: 0
SHORTNESS OF BREATH: 0
EYE DISCHARGE: 0
EYE REDNESS: 0
DIARRHEA: 0

## 2024-05-23 ASSESSMENT — PAIN DESCRIPTION - LOCATION
LOCATION_2: EAR
LOCATION: THROAT

## 2024-05-23 ASSESSMENT — PAIN SCALES - GENERAL: PAINLEVEL_OUTOF10: 4

## 2024-05-23 ASSESSMENT — PAIN - FUNCTIONAL ASSESSMENT: PAIN_FUNCTIONAL_ASSESSMENT: 0-10

## 2024-05-23 ASSESSMENT — PAIN DESCRIPTION - ORIENTATION: ORIENTATION_2: RIGHT

## 2024-05-23 ASSESSMENT — PAIN DESCRIPTION - INTENSITY: RATING_2: 5

## 2024-05-23 ASSESSMENT — PAIN DESCRIPTION - DESCRIPTORS
DESCRIPTORS_2: DULL;PRESSURE
DESCRIPTORS: SORE

## 2024-05-23 NOTE — ED NOTES
Pt with complaints of nasal congestion, ear pain and a sore throat that started 1 week ago. States decongestants have not helped. States she has had drainage from ear.     Aleksandra Humphreys LPN  05/23/24 0918

## 2024-05-23 NOTE — ED PROVIDER NOTES
[facility] URGENT CARE  Urgent Care Encounter      CHIEF COMPLAINT       Chief Complaint   Patient presents with    Nasal Congestion    Pharyngitis    Otalgia     arian       Nurses Notes reviewed and I agree except as noted in the HPI.  HISTORY OF PRESENT ILLNESS   Joshua Orourke is a 23 y.o. female who presents urgent care for evaluation of nasal congestion, sore throat and bilateral ear pain.  Patient reports onset of symptoms about a week ago.  She has been taking over-the-counter sinus and cold medication without any relief in symptoms.  She feels as if her throat and ears are now draining.  She reports she can feel the drainage in her throat.  Denies any fevers, body aches, sinus pain.  Does endorse and a little bit of sinus pressure.  Denies any history of seasonal allergies.    REVIEW OF SYSTEMS     Review of Systems   Constitutional:  Negative for chills, diaphoresis, fatigue and fever.   HENT:  Positive for congestion, rhinorrhea, sinus pressure and sore throat. Negative for ear pain and trouble swallowing.    Eyes:  Negative for discharge and redness.   Respiratory:  Negative for cough and shortness of breath.    Cardiovascular:  Negative for chest pain.   Gastrointestinal:  Negative for diarrhea, nausea and vomiting.   Genitourinary:  Negative for decreased urine volume.   Musculoskeletal:  Negative for neck pain and neck stiffness.   Skin:  Negative for rash.   Neurological:  Negative for headaches.   Hematological:  Negative for adenopathy.   Psychiatric/Behavioral:  Negative for sleep disturbance.        PAST MEDICAL HISTORY   History reviewed. No pertinent past medical history.    SURGICAL HISTORY     Patient  has no past surgical history on file.    CURRENT MEDICATIONS       Previous Medications    BUPROPION (WELLBUTRIN XL) 150 MG EXTENDED RELEASE TABLET    Take 1 tablet by mouth every morning    BUTALBITAL-ACETAMINOPHEN-CAFFEINE (FIORICET, ESGIC) -40 MG PER TABLET    Take 1  sounds. No murmur heard.     No friction rub. No gallop.   Pulmonary:      Effort: Pulmonary effort is normal. No respiratory distress.      Breath sounds: Normal breath sounds.   Lymphadenopathy:      Head:      Right side of head: No submental, submandibular, tonsillar or occipital adenopathy.      Left side of head: No submental, submandibular, tonsillar or occipital adenopathy.      Cervical: No cervical adenopathy.      Upper Body:      Right upper body: No supraclavicular adenopathy.      Left upper body: No supraclavicular adenopathy.   Skin:     General: Skin is warm and dry.      Capillary Refill: Capillary refill takes less than 2 seconds.      Coloration: Skin is not pale.      Findings: No rash.   Neurological:      General: No focal deficit present.      Mental Status: She is alert and oriented to person, place, and time. She is not disoriented.   Psychiatric:         Mood and Affect: Mood normal.         Behavior: Behavior is cooperative.         DIAGNOSTIC RESULTS   Labs:No results found for this visit on 05/23/24.    IMAGING:  No orders to display      URGENT CARE COURSE:     Vitals:    05/23/24 0916   BP: 125/67   Pulse: 60   Resp: 18   Temp: 97.7 °F (36.5 °C)   TempSrc: Temporal   SpO2: 98%   Weight: 78.5 kg (173 lb)   Height: 1.575 m (5' 2\")       Medications - No data to display  PROCEDURES:  None  FINAL IMPRESSION      1. Nasopharyngitis    2. Nasal congestion        DISPOSITION/PLAN   DISPOSITION Decision To Discharge 05/23/2024 09:34:02 AM    Patient seen and evaluated for the above symptoms.  Assessment consistent with acute viral URI with cough.  I did discuss with patient that the symptoms are likely viral in nature and that antibiotics would not be effective at this time.  We did discuss that the symptoms are likely benign and self-limiting.  Symptoms may be present for up to 7 to 10 days.  Prescribed prednisone 20 mg daily for  3 days to reduce inflammation. Patient is encouraged to use

## 2024-05-23 NOTE — DISCHARGE INSTRUCTIONS
Prescribed prednisone 20 mg daily for three days. Use Zyrtec, Flonase, and Mucinex D.  Can use other over-the-counter cough suppressant.  Should have good hand hygiene and cover mouth when coughing.  Use over-the-counter Tylenol and Motrin for pain or fever.  Follow-up with PCP in 3 to 5 days and worsening symptoms.

## 2024-11-26 ENCOUNTER — HOSPITAL ENCOUNTER (EMERGENCY)
Age: 24
Discharge: HOME | End: 2024-11-26
Payer: MEDICAID

## 2024-11-26 VITALS
SYSTOLIC BLOOD PRESSURE: 128 MMHG | HEIGHT: 63 IN | OXYGEN SATURATION: 97 % | TEMPERATURE: 98.6 F | WEIGHT: 180 LBS | BODY MASS INDEX: 31.89 KG/M2 | RESPIRATION RATE: 16 BRPM | HEART RATE: 91 BPM | DIASTOLIC BLOOD PRESSURE: 69 MMHG

## 2024-11-26 DIAGNOSIS — J06.9 VIRAL URI WITH COUGH: Primary | ICD-10-CM

## 2024-11-26 PROCEDURE — 99213 OFFICE O/P EST LOW 20 MIN: CPT

## 2024-11-26 PROCEDURE — 99213 OFFICE O/P EST LOW 20 MIN: CPT | Performed by: NURSE PRACTITIONER

## 2024-11-26 RX ORDER — GUAIFENESIN, PSEUDOEPHEDRINE HYDROCHLORIDE 600; 60 MG/1; MG/1
1 TABLET, EXTENDED RELEASE ORAL EVERY 12 HOURS PRN
Qty: 14 TABLET | Refills: 1 | Status: SHIPPED | OUTPATIENT
Start: 2024-11-26 | End: 2024-12-10

## 2024-11-26 ASSESSMENT — PAIN DESCRIPTION - DESCRIPTORS: DESCRIPTORS: ACHING

## 2024-11-26 ASSESSMENT — ENCOUNTER SYMPTOMS
SORE THROAT: 0
CHEST TIGHTNESS: 0
RHINORRHEA: 1
COUGH: 1
VOMITING: 0
SHORTNESS OF BREATH: 0
DIARRHEA: 0
NAUSEA: 0

## 2024-11-26 ASSESSMENT — PAIN DESCRIPTION - PAIN TYPE: TYPE: ACUTE PAIN

## 2024-11-26 ASSESSMENT — PAIN - FUNCTIONAL ASSESSMENT: PAIN_FUNCTIONAL_ASSESSMENT: 0-10

## 2024-11-26 ASSESSMENT — PAIN DESCRIPTION - LOCATION: LOCATION: CHEST

## 2024-11-26 ASSESSMENT — PAIN SCALES - GENERAL: PAINLEVEL_OUTOF10: 4

## 2024-11-26 NOTE — ED PROVIDER NOTES
[facility] URGENT CARE  Urgent Care Encounter       CHIEF COMPLAINT       Chief Complaint   Patient presents with    Cough    Nasal Congestion     Onset of all symptoms x 24 hours       Nurses Notes reviewed and I agree except as noted in the HPI.  HISTORY OF PRESENT ILLNESS   Joshua Orourke is a 23 y.o. female who presents to the [facility] urgent care for evaluation of cough and congestion.  Reports this symptom started yesterday.  Reports congestion, rhinorrhea, and cough.  Denies fever or chills.  Denies nausea, vomiting or diarrhea.    The history is provided by the patient. No  was used.       REVIEW OF SYSTEMS     Review of Systems   Constitutional:  Negative for activity change, appetite change, chills, fatigue and fever.   HENT:  Positive for congestion and rhinorrhea. Negative for ear discharge, ear pain and sore throat.    Respiratory:  Positive for cough. Negative for chest tightness and shortness of breath.    Cardiovascular:  Negative for chest pain.   Gastrointestinal:  Negative for diarrhea, nausea and vomiting.   Genitourinary:  Negative for dysuria.   Skin:  Negative for rash.   Allergic/Immunologic: Negative for environmental allergies and food allergies.   Neurological:  Negative for dizziness and headaches.       PAST MEDICAL HISTORY   History reviewed. No pertinent past medical history.    SURGICALHISTORY     Patient  has no past surgical history on file.    CURRENT MEDICATIONS       Discharge Medication List as of 11/26/2024  3:12 PM        CONTINUE these medications which have NOT CHANGED    Details   norelgestromin-ethinyl estradiol (XULANE) 150-35 MCG/24HR Place 1 patch onto the skin once a week, Disp-6 patch, R-5Normal      cetirizine (ZYRTEC) 10 MG tablet Take 1 tablet by mouth daily, Disp-30 tablet, R-0Normal      fluticasone (FLONASE) 50 MCG/ACT nasal spray 2 sprays by Each Nostril route daily, Disp-16 g, R-0Normal      buPROPion (WELLBUTRIN XL) 150